# Patient Record
Sex: MALE | Race: WHITE
[De-identification: names, ages, dates, MRNs, and addresses within clinical notes are randomized per-mention and may not be internally consistent; named-entity substitution may affect disease eponyms.]

---

## 2019-09-06 ENCOUNTER — HOSPITAL ENCOUNTER (EMERGENCY)
Dept: HOSPITAL 76 - ED | Age: 64
Discharge: HOME | End: 2019-09-06
Payer: COMMERCIAL

## 2019-09-06 VITALS — DIASTOLIC BLOOD PRESSURE: 91 MMHG | SYSTOLIC BLOOD PRESSURE: 145 MMHG

## 2019-09-06 DIAGNOSIS — L03.113: ICD-10-CM

## 2019-09-06 DIAGNOSIS — J34.0: ICD-10-CM

## 2019-09-06 DIAGNOSIS — L03.114: Primary | ICD-10-CM

## 2019-09-06 DIAGNOSIS — L98.499: ICD-10-CM

## 2019-09-06 PROCEDURE — 99282 EMERGENCY DEPT VISIT SF MDM: CPT

## 2019-09-06 PROCEDURE — 99284 EMERGENCY DEPT VISIT MOD MDM: CPT

## 2019-09-06 NOTE — ED PHYSICIAN DOCUMENTATION
History of Present Illness





- Stated complaint


Stated Complaint: SORES MULT. AREAS





- Chief complaint


Chief Complaint: Wound





- Additonal information


Additional information: 





This is a 63-year-old male who presents with multiple skin lesions.  Patient 

works with drug addicted individuals, he states that he has not had any exposure

to anyone with obvious lesions, but in the last week he began developing a red 

spot over his hand, and then another one over his right arm.  He thought the 

first 1 was an ingrown hair, he picked that it, and there was a small amount of 

clear drainage, and since he picked at it it has become more inflamed.  He also 

scratched at the lesion on his right arm, and this appears more inflamed as 

well.  He is developing some redness over his nose which is concerned as another

lesion as well.  He denies any lesions in his mouth, eyes, or elsewhere on his 

body.  He denies fever, he has no history of dermatologic problems.





Review of Systems


Constitutional: denies: Fever


Musculoskeletal: reports: Other (Several open sores)


Immunocompromised: denies: Immunocompromised





PD PAST MEDICAL HISTORY





- Past Medical History


Musculoskeletal: Other (shoulder bursitis)





- Present Medications


Home Medications: 


                                Ambulatory Orders











 Medication  Instructions  Recorded  Confirmed


 


Cephalexin [Keflex] 500 mg PO Q6H #28 capsule 09/06/19 


 


Sulfamethox/Trimeth 800/160 1 each PO BID #14 tablet 09/06/19 





[Bactrim Ds 800/160]   














- Allergies


Allergies/Adverse Reactions: 


                                    Allergies











Allergy/AdvReac Type Severity Reaction Status Date / Time


 


No Known Drug Allergies Allergy   Verified 09/06/19 14:08














- Living Situation


Living Arrangement: reports: At home





- Social History


Does the pt have substance abuse?: No





- Family History


Family history: reports: Non contributory





PD ED PE NORMAL





- Vitals


Vital signs reviewed: Yes





- General


General: Alert and oriented X 3, No acute distress





- HEENT


HEENT: Pharynx benign





- Cardiac


Cardiac: Other (Well-perfused extremities)





- Respiratory


Respiratory: No respiratory distress





- Abdomen


Abdomen: Normal bowel sounds, Soft, Non tender, Non distended





- Derm


Derm: Other (Over the left dorsal hand there is a 2 cm x 2 cm shallow ul

ceration, with no active drainage.  There is minimal surrounding erythema.  

There is a 1.5 cm x 1.5 cm excoriated shallow ulceration over his right forearm 

as well.  Over the bridge of his nose there is a 2 cm x 1.5 cm area of erythema 

without pustules, open sore, or blisters. No fluctuance of any lesion)





- Extremities


Extremities: No deformity





- Neuro


Neuro: Alert and oriented X 3





- Psych


Psych: Normal mood, Normal affect





Results





- Vitals


Vitals: 


                               Vital Signs - 24 hr











  09/06/19





  14:06


 


Temperature 36.7 C


 


Heart Rate 60


 


Respiratory 18





Rate 


 


Blood Pressure 145/91 H


 


O2 Saturation 99








                                     Oxygen











O2 Source                      Room air

















PD MEDICAL DECISION MAKING





- ED course


Complexity details: considered differential (Skin infection, cellulitis, 

excoriation, superinfection, MRSA, syphilis, zoonotic infection)


ED course: 


Patient presents with several lesions on his skin, these began as red areas 

which he has picked that and opened into shallow ulcerations.  I have a high 

concern that these are a MRSA and given that patient works with individuals at 

high risk for MRSA. There is no involvement of his mucous membranes, he has no 

history of previous lesions, and I have a low suspicion for autoimmune disease, 

zoonotic infection, or syphilis based on his exposure history.  I discussed that

we should try treatment with antibiotics - we will prescribe him Keflex and 

Bactrim, first dose was given here.  He will follow-up with his primary care 

provider, and if he develops worsening or his lesions are not improving he can 

return to the emergency department.  Patient agreed this plan was discharged 

home.








Departure





- Departure


Disposition: 01 Home, Self Care


Clinical Impression: 


Cellulitis


Qualifiers:


 Site of cellulitis: unspecified site Qualified Code(s): L03.90 - Cellulitis, 

unspecified





Condition: Good


Instructions:  ED Infec Skin Cellulitis


Follow-Up: 


Your,PCP [Other] (Within one week for recheck)


Prescriptions: 


Cephalexin [Keflex] 500 mg PO Q6H #28 capsule


Sulfamethox/Trimeth 800/160 [Bactrim Ds 800/160] 1 each PO BID #14 tablet


Comments: 


You were seen today for several open sores. These appear to be a skin infection,

I am concerned this may be MRSA.  Please keep these covered with simple 

bandages, take the entire course of antibiotic. You may use a thin layer of 

antibiotic ointment over top of them as well. Do not scratch or pick at the 

lesions. If you are developing new lesions, or other new or concerning symptoms 

return to the emergency department.  Otherwise follow-up with your primary care 

provider for recheck within a week.